# Patient Record
Sex: FEMALE | Race: WHITE
[De-identification: names, ages, dates, MRNs, and addresses within clinical notes are randomized per-mention and may not be internally consistent; named-entity substitution may affect disease eponyms.]

---

## 2017-04-05 ENCOUNTER — HOSPITAL ENCOUNTER (EMERGENCY)
Dept: HOSPITAL 10 - E/R | Age: 77
Discharge: LEFT BEFORE DISCHARGE | End: 2017-04-05
Payer: COMMERCIAL

## 2017-04-05 VITALS
WEIGHT: 154.32 LBS | WEIGHT: 154.32 LBS | HEIGHT: 60 IN | BODY MASS INDEX: 30.3 KG/M2 | HEIGHT: 60 IN | BODY MASS INDEX: 30.3 KG/M2

## 2017-04-05 DIAGNOSIS — Z53.21: Primary | ICD-10-CM

## 2018-10-09 ENCOUNTER — HOSPITAL ENCOUNTER (EMERGENCY)
Age: 78
Discharge: HOME | End: 2018-10-09

## 2018-10-09 ENCOUNTER — HOSPITAL ENCOUNTER (EMERGENCY)
Dept: HOSPITAL 91 - E/R | Age: 78
Discharge: HOME | End: 2018-10-09
Payer: MEDICARE
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
## 2018-10-09 DIAGNOSIS — S01.01XA: Primary | ICD-10-CM

## 2018-10-09 DIAGNOSIS — I10: ICD-10-CM

## 2018-10-09 DIAGNOSIS — Z23: ICD-10-CM

## 2018-10-09 PROCEDURE — 99284 EMERGENCY DEPT VISIT MOD MDM: CPT

## 2018-10-09 PROCEDURE — 12001 RPR S/N/AX/GEN/TRNK 2.5CM/<: CPT

## 2018-10-09 PROCEDURE — 70450 CT HEAD/BRAIN W/O DYE: CPT

## 2018-10-09 PROCEDURE — 90715 TDAP VACCINE 7 YRS/> IM: CPT

## 2018-10-09 PROCEDURE — 72125 CT NECK SPINE W/O DYE: CPT

## 2018-10-09 PROCEDURE — 93005 ELECTROCARDIOGRAM TRACING: CPT

## 2018-10-09 PROCEDURE — 90471 IMMUNIZATION ADMIN: CPT

## 2018-10-09 RX ADMIN — CLOSTRIDIUM TETANI TOXOID ANTIGEN (FORMALDEHYDE INACTIVATED), CORYNEBACTERIUM DIPHTHERIAE TOXOID ANTIGEN (FORMALDEHYDE INACTIVATED), BORDETELLA PERTUSSIS TOXOID ANTIGEN (GLUTARALDEHYDE INACTIVATED), BORDETELLA PERTUSSIS FILAMENTOUS HEMAGGLUTININ ANTIGEN (FORMALDEHYDE INACTIVATED), BORDETELLA PERTUSSIS PERTACTIN ANTIGEN, AND BORDETELLA PERTUSSIS FIMBRIAE 2/3 ANTIGEN 1 ML: 5; 2; 2.5; 5; 3; 5 INJECTION, SUSPENSION INTRAMUSCULAR at 17:55

## 2018-10-22 ENCOUNTER — HOSPITAL ENCOUNTER (EMERGENCY)
Dept: HOSPITAL 91 - E/R | Age: 78
Discharge: HOME | End: 2018-10-22
Payer: MEDICARE

## 2018-10-22 ENCOUNTER — HOSPITAL ENCOUNTER (EMERGENCY)
Age: 78
Discharge: HOME | End: 2018-10-22

## 2018-10-22 DIAGNOSIS — Z48.02: Primary | ICD-10-CM

## 2018-10-22 DIAGNOSIS — I10: ICD-10-CM

## 2018-10-22 PROCEDURE — 99281 EMR DPT VST MAYX REQ PHY/QHP: CPT

## 2021-12-02 ENCOUNTER — HOSPITAL ENCOUNTER (INPATIENT)
Dept: HOSPITAL 54 - ER | Age: 81
LOS: 2 days | Discharge: NURSING, REHAB OR LONG-TERM CARE | DRG: 884 | End: 2021-12-04
Attending: INTERNAL MEDICINE | Admitting: PSYCHIATRY & NEUROLOGY
Payer: COMMERCIAL

## 2021-12-02 VITALS — WEIGHT: 95 LBS | HEIGHT: 58 IN | BODY MASS INDEX: 19.94 KG/M2

## 2021-12-02 DIAGNOSIS — F06.31: Primary | ICD-10-CM

## 2021-12-02 DIAGNOSIS — Z73.6: ICD-10-CM

## 2021-12-02 DIAGNOSIS — N39.0: ICD-10-CM

## 2021-12-02 DIAGNOSIS — L89.309: ICD-10-CM

## 2021-12-02 DIAGNOSIS — Z20.822: ICD-10-CM

## 2021-12-02 DIAGNOSIS — G93.41: ICD-10-CM

## 2021-12-02 DIAGNOSIS — F29: ICD-10-CM

## 2021-12-02 DIAGNOSIS — I10: ICD-10-CM

## 2021-12-02 DIAGNOSIS — R62.7: ICD-10-CM

## 2021-12-02 DIAGNOSIS — F32.A: ICD-10-CM

## 2021-12-02 LAB
ALBUMIN SERPL BCP-MCNC: 2.2 G/DL (ref 3.4–5)
ALP SERPL-CCNC: 175 U/L (ref 46–116)
ALT SERPL W P-5'-P-CCNC: 33 U/L (ref 12–78)
APAP SERPL-MCNC: 103 UG/ML (ref 10–30)
AST SERPL W P-5'-P-CCNC: 48 U/L (ref 15–37)
BASOPHILS # BLD AUTO: 0 K/UL (ref 0–0.2)
BASOPHILS NFR BLD AUTO: 0.3 % (ref 0–2)
BILIRUB DIRECT SERPL-MCNC: 0.5 MG/DL (ref 0–0.2)
BILIRUB SERPL-MCNC: 1.2 MG/DL (ref 0.2–1)
BILIRUB UR QL STRIP: (no result)
BUN SERPL-MCNC: 5 MG/DL (ref 7–18)
CALCIUM SERPL-MCNC: 8.2 MG/DL (ref 8.5–10.1)
CHLORIDE SERPL-SCNC: 107 MMOL/L (ref 98–107)
CO2 SERPL-SCNC: 26 MMOL/L (ref 21–32)
COLOR UR: (no result)
CREAT SERPL-MCNC: 0.6 MG/DL (ref 0.6–1.3)
EOSINOPHIL NFR BLD AUTO: 0.8 % (ref 0–6)
ETHANOL SERPL-MCNC: < 3 MG/DL (ref 0–0)
GLUCOSE SERPL-MCNC: 96 MG/DL (ref 74–106)
HCT VFR BLD AUTO: 39 % (ref 33–45)
HGB BLD-MCNC: 12.9 G/DL (ref 11.5–14.8)
LYMPHOCYTES NFR BLD AUTO: 1.3 K/UL (ref 0.8–4.8)
LYMPHOCYTES NFR BLD AUTO: 19.4 % (ref 20–44)
MCHC RBC AUTO-ENTMCNC: 33 G/DL (ref 31–36)
MCV RBC AUTO: 101 FL (ref 82–100)
MONOCYTES NFR BLD AUTO: 0.5 K/UL (ref 0.1–1.3)
MONOCYTES NFR BLD AUTO: 8.1 % (ref 2–12)
NEUTROPHILS # BLD AUTO: 4.7 K/UL (ref 1.8–8.9)
NEUTROPHILS NFR BLD AUTO: 71.4 % (ref 43–81)
PH UR STRIP: 5.5 [PH] (ref 5–8)
PLATELET # BLD AUTO: 145 K/UL (ref 150–450)
POTASSIUM SERPL-SCNC: 4.2 MMOL/L (ref 3.5–5.1)
PROT SERPL-MCNC: 6.4 G/DL (ref 6.4–8.2)
PROT UR QL STRIP: 100 MG/DL
RBC # BLD AUTO: 3.86 MIL/UL (ref 4–5.2)
RBC #/AREA URNS HPF: (no result) /HPF (ref 0–2)
SODIUM SERPL-SCNC: 140 MMOL/L (ref 136–145)
UROBILINOGEN UR STRIP-MCNC: 1 EU/DL
WBC #/AREA URNS HPF: (no result) /HPF
WBC NRBC COR # BLD AUTO: 6.6 K/UL (ref 4.3–11)

## 2021-12-02 PROCEDURE — G0480 DRUG TEST DEF 1-7 CLASSES: HCPCS

## 2021-12-02 PROCEDURE — C9803 HOPD COVID-19 SPEC COLLECT: HCPCS

## 2021-12-02 NOTE — NUR
PT BIBA RA89 FOR WEAKNESS AND ALTERED MENTAL STATE X 2 DAYS. PT IS NOT A 
RELIABILE HISTORIAN, MELISSAOY BEING GIVEN BY CARETAKER, WHO REPORTS SHE WAS FOUND 
ON FLOOR 2 DAYS AGO AND REPORTS THAT SHE IS UNABLE TO CARE FOR HERSELF. A&O TO 
PERSON AND PLACE. SACRAL PRESSURE ULCER, BREATHING EVEN AND UNLABORED, ASSISTED 
TO BED, BLANKETS GIVEN

## 2021-12-03 VITALS — DIASTOLIC BLOOD PRESSURE: 69 MMHG | SYSTOLIC BLOOD PRESSURE: 129 MMHG

## 2021-12-03 VITALS — SYSTOLIC BLOOD PRESSURE: 130 MMHG | DIASTOLIC BLOOD PRESSURE: 76 MMHG

## 2021-12-03 VITALS — SYSTOLIC BLOOD PRESSURE: 119 MMHG | DIASTOLIC BLOOD PRESSURE: 76 MMHG

## 2021-12-03 VITALS — DIASTOLIC BLOOD PRESSURE: 70 MMHG | SYSTOLIC BLOOD PRESSURE: 111 MMHG

## 2021-12-03 RX ADMIN — DULOXETINE HYDROCHLORIDE SCH MG: 30 CAPSULE, DELAYED RELEASE ORAL at 10:52

## 2021-12-03 RX ADMIN — Medication SCH ML: at 08:05

## 2021-12-03 RX ADMIN — Medication SCH ML: at 16:22

## 2021-12-03 RX ADMIN — LEVOFLOXACIN SCH MG: 250 TABLET, FILM COATED ORAL at 12:34

## 2021-12-03 NOTE — NUR
UR Insurance:

SW received a call from  Madelyn ([phone]) (F:[phone]) who coordinated 
transportation at 1PM for 12/4.

## 2021-12-03 NOTE — NUR
CRISTIAN Initial Discharge Plan:



Patient lives at home located at [address] [place] [address]; ([phone]). 
Patient's caregiver Noel takes care of pt Monday-Saturday for six hours. CRISTIAN contacted [employer] 
to gather collateral. SW attempted to contact pt's  Miesha ([phone]) and 
left a voicemail. CRISTIAN will work with the MD and treatment team to coordinate of discharge.

## 2021-12-03 NOTE — NUR
GPS ADMISSION NOTE



ADMITTED THIS 81-Y/O FEMALE, BIB PARAMEDICS PT CAME FROM HOME. ADMITTED ON 5150 FOR GD. PER 
HOLD, PT SUFFERS FROM DEPRESSION. SHE HAS FALLEN AND WAS LYING IN HER OWN FECES AND URINE. 
UPON FACE TO FACE ASSESSMENT, PT IS ALERT AND ORIENTED X1-2, DISHEVELED, UNKEMPT, DEPRESSED 
AND CONFUSED. PT DENIES SI/HI AT THIS TIME. BOTH MD AWARE AND NOTIFIED OF THE ADMISSION. 
BELONGINGS AND CONTRABAND WERE DONE. SKIN ASSESSMENT DONE AND NOTED WITH RIGHT AND LEFT 
BUTTOCK WOUND. TURN AND REPOSITION Q2 HRS PER PROTOCOL. WOUND CARE CONSULT TRIGGERED. PT'S 
RIGHTS DISCUSSED. PROVIDED PT WITH HANDBOOK AND MEDICATIONS GUIDE. ENVIRONMENTAL SAFETY 
CHECK DONE. ENCOURAGED PT TO VERBALIZE ANY FEELINGS OF CONCERN TO STAFF. ORIENTED PT TO UNIT 
POLICY. NO ACUTE DISTRESS NOTED. VSS. PT DENIES ANY PAIN OR DISCOMFORT AT THIS TIME. BED IN 
LOWEST LOCKED POSITION, HOB ELEVATED, SIDE RAILS UP X2. WILL MONITOR Q15 MINS ROUNDS FOR 
SAFETY AND BEHAVIOR.

-------------------------------------------------------------------------------

Addendum: 12/03/21 at 0651 by KIM CRESPO RN

-------------------------------------------------------------------------------

PER PATIENT SHE DOES NOT HAVE FAMILY/FRIENDS TO NOTIFY OF HES ADMISSION. 

-------------------------------------------------------------------------------

Addendum: 12/03/21 at 0652 by NOVEM O SORONIO RN

-------------------------------------------------------------------------------

HER ADMISSION

## 2021-12-03 NOTE — NUR
[facility]:

SW contacted [facility] and spoke with Lacey the supervisor who is covering for Miesha 
 ([phone]) and stated pt will be discharged to [facility] 12/4.

## 2021-12-03 NOTE — NUR
GPS RN NOTES:

PATIENT REQUESTED FOR SLEEP MEDICATION D/T INSOMNIA. AMBIEN 5MG GIVEN PO AT 2220. WILL 
CONTINUE TO MONITOR.

## 2021-12-03 NOTE — NUR
UR Insurance:

SW received a call from  Madelyn ([phone]) (F:[phone]) who stated 
[facility] is accepting pt.

## 2021-12-03 NOTE — NUR
UR Note:

Auth:[id number]. Lorraine  ([phone]) approved for pt's stay at the 
hospital until 12/4.

## 2021-12-03 NOTE — NUR
UR Insurance:

CRISTIAN received a call from  Madelyn ([phone]) (F:[phone]) who stated that 
Dr. Armas requested for placement for pt. CRISTIAN sent clinicals to Madelyn to review and find 
placement.

## 2021-12-03 NOTE — NUR
[facility]:



SW attempted to contact pt's  Miesha ([phone]) and left a voicemail to 
gather collateral and was unavailable at this time.

## 2021-12-03 NOTE — NUR
PT TRANSPORTED TO [place] [address] ON ST[facility]ER WITHOUT INCIDENT. ALL BELONGINGS AND PT 
PAPERWORK TRANSFERRED WITH PATIENT. V/S STABLE AT TIME OF TRANSFER.

## 2021-12-03 NOTE — NUR
[facility]:

SW received a call from Keli  ([phone]) covering for CRISTAIN Whiteside. She 
reported that pt needs more care at home and that [facility] caregiver is not enough for pt and 
that she needs higher level of care.

## 2021-12-03 NOTE — NUR
CRISTIAN SATURDAY DISCHARGE ENTRY:

Patient will be discharged to [facility] facility located at [address] [place] [address]; ([phone]). Madelyn  ([phone]) coordinated 
transportation between 1PM. Madelyn case work for [facility] ([phone]) stated facility is 
accepting pt. CRISTIAN notified pts [facility] caregiver Noel ([phone]) and  Miesha 
([phone]), this writer left a voicemail of pts discharge. Patient is alert and 
oriented x3. Patient denies suicidal or homicidal ideation. Patient denies visual/auditory 
hallucination. Patient will follow up with (Internist) Dr. Myles Vega located at [address], [place] [address]; ([phone]) at the facility. Patient will be assigned 
to a psychiatrist at the facility. Patient presents with euthymic mood and congruent affect.

## 2021-12-03 NOTE — NUR
Caregiver:

Patient's caregiver Gercy takes care of pt Monday-Saturday for six hours. SW contacted Noel 
to gather collateral.

## 2021-12-03 NOTE — NUR
GPS-RN NOTES: 



CALLED TO DR. GUADALUPE REGARDING MED RECON THAT NEEDS TO BE DONE. DR. GUADALUPE STATED, I WILL CHECK 
IT OUT, USUALLY I AM NOT SEEING [facility] PATIENT. CHARGE NURSE AWARE. WILL ENDORSE TO AM 
SHIFT NURSE FOR FOLLOW UP.

## 2021-12-03 NOTE — NUR
UR Note:

CRISTIAN received a call from  Lorraine (phone: [phone]) (Fax: [phone]) and CRISTIAN 
sent patient's H & P notes, progress notes, and medication list. Lorraine stated she will 
contact this SW with auth number and approval days.

## 2021-12-04 VITALS — SYSTOLIC BLOOD PRESSURE: 121 MMHG | DIASTOLIC BLOOD PRESSURE: 78 MMHG

## 2021-12-04 LAB
BASOPHILS # BLD AUTO: 0 K/UL (ref 0–0.2)
BASOPHILS NFR BLD AUTO: 0.6 % (ref 0–2)
BUN SERPL-MCNC: 7 MG/DL (ref 7–18)
CALCIUM SERPL-MCNC: 8 MG/DL (ref 8.5–10.1)
CHLORIDE SERPL-SCNC: 110 MMOL/L (ref 98–107)
CHOLEST SERPL-MCNC: 146 MG/DL (ref ?–200)
CO2 SERPL-SCNC: 23 MMOL/L (ref 21–32)
CREAT SERPL-MCNC: 0.6 MG/DL (ref 0.6–1.3)
EOSINOPHIL NFR BLD AUTO: 1.3 % (ref 0–6)
GLUCOSE SERPL-MCNC: 64 MG/DL (ref 74–106)
HCT VFR BLD AUTO: 36 % (ref 33–45)
HDLC SERPL-MCNC: 34 MG/DL (ref 40–60)
HGB BLD-MCNC: 12.2 G/DL (ref 11.5–14.8)
LDLC SERPL DIRECT ASSAY-MCNC: 91 MG/DL (ref 0–99)
LYMPHOCYTES NFR BLD AUTO: 2 K/UL (ref 0.8–4.8)
LYMPHOCYTES NFR BLD AUTO: 30.3 % (ref 20–44)
MCHC RBC AUTO-ENTMCNC: 34 G/DL (ref 31–36)
MCV RBC AUTO: 101 FL (ref 82–100)
MONOCYTES NFR BLD AUTO: 0.9 K/UL (ref 0.1–1.3)
MONOCYTES NFR BLD AUTO: 12.8 % (ref 2–12)
NEUTROPHILS # BLD AUTO: 3.7 K/UL (ref 1.8–8.9)
NEUTROPHILS NFR BLD AUTO: 55 % (ref 43–81)
PLATELET # BLD AUTO: 129 K/UL (ref 150–450)
POTASSIUM SERPL-SCNC: 3.5 MMOL/L (ref 3.5–5.1)
RBC # BLD AUTO: 3.57 MIL/UL (ref 4–5.2)
SODIUM SERPL-SCNC: 140 MMOL/L (ref 136–145)
TRIGL SERPL-MCNC: 86 MG/DL (ref 30–150)
WBC NRBC COR # BLD AUTO: 6.7 K/UL (ref 4.3–11)

## 2021-12-04 RX ADMIN — DULOXETINE HYDROCHLORIDE SCH MG: 30 CAPSULE, DELAYED RELEASE ORAL at 08:14

## 2021-12-04 RX ADMIN — Medication SCH ML: at 08:03

## 2021-12-04 RX ADMIN — LEVOFLOXACIN SCH MG: 250 TABLET, FILM COATED ORAL at 10:25

## 2021-12-04 NOTE — NUR
RN-CO: DR MARTINEZ GAVE AN ORDER TO DISCONTINUE HOLD AND DISCHARGE PT TO [facility]. AND ORDERED TO CONTINUE HER PSYCH MEDICATIONS, NOTED AND CARRIED OUT.

## 2021-12-04 NOTE — NUR
GPS/RN PT LEFT VIA AMBULANCE. EXIT CARE AND PRESCRIPTIONS PROVIDED. VSS. PROPERTY RETURNED. 
PT REFUSED TO SIGN DISCHARGE FORMS. NO SI OR HI AT THE TIME OF DISCHARGE REPORTED.